# Patient Record
Sex: FEMALE | Race: WHITE | NOT HISPANIC OR LATINO
[De-identification: names, ages, dates, MRNs, and addresses within clinical notes are randomized per-mention and may not be internally consistent; named-entity substitution may affect disease eponyms.]

---

## 2018-02-01 PROBLEM — Z00.00 ENCOUNTER FOR PREVENTIVE HEALTH EXAMINATION: Status: ACTIVE | Noted: 2018-02-01

## 2018-02-12 ENCOUNTER — APPOINTMENT (OUTPATIENT)
Dept: RADIOLOGY | Facility: CLINIC | Age: 83
End: 2018-02-12

## 2018-08-27 ENCOUNTER — APPOINTMENT (OUTPATIENT)
Dept: RADIOLOGY | Facility: CLINIC | Age: 83
End: 2018-08-27
Payer: MEDICARE

## 2018-08-27 ENCOUNTER — OUTPATIENT (OUTPATIENT)
Dept: OUTPATIENT SERVICES | Facility: HOSPITAL | Age: 83
LOS: 1 days | End: 2018-08-27
Payer: MEDICARE

## 2018-08-27 DIAGNOSIS — Z00.8 ENCOUNTER FOR OTHER GENERAL EXAMINATION: ICD-10-CM

## 2018-08-27 PROCEDURE — 77080 DXA BONE DENSITY AXIAL: CPT | Mod: 26

## 2018-08-27 PROCEDURE — 77080 DXA BONE DENSITY AXIAL: CPT

## 2018-11-14 ENCOUNTER — OUTPATIENT (OUTPATIENT)
Dept: OUTPATIENT SERVICES | Facility: HOSPITAL | Age: 83
LOS: 1 days | End: 2018-11-14

## 2018-11-14 ENCOUNTER — APPOINTMENT (OUTPATIENT)
Dept: RADIOLOGY | Facility: CLINIC | Age: 83
End: 2018-11-14
Payer: SUBSIDIZED

## 2018-11-14 DIAGNOSIS — Z13.820 ENCOUNTER FOR SCREENING FOR OSTEOPOROSIS: ICD-10-CM

## 2018-11-14 PROCEDURE — 77080 DXA BONE DENSITY AXIAL: CPT

## 2018-11-14 PROCEDURE — 77080 DXA BONE DENSITY AXIAL: CPT | Mod: 26

## 2022-07-06 ENCOUNTER — OFFICE (OUTPATIENT)
Dept: URBAN - METROPOLITAN AREA CLINIC 35 | Facility: CLINIC | Age: 87
Setting detail: OPHTHALMOLOGY
End: 2022-07-06
Payer: MEDICARE

## 2022-07-06 DIAGNOSIS — H02.403: ICD-10-CM

## 2022-07-06 DIAGNOSIS — H52.4: ICD-10-CM

## 2022-07-06 DIAGNOSIS — H40.011: ICD-10-CM

## 2022-07-06 DIAGNOSIS — H35.342: ICD-10-CM

## 2022-07-06 DIAGNOSIS — Z96.1: ICD-10-CM

## 2022-07-06 PROCEDURE — 99204 OFFICE O/P NEW MOD 45 MIN: CPT | Performed by: OPHTHALMOLOGY

## 2022-07-06 PROCEDURE — 92015 DETERMINE REFRACTIVE STATE: CPT | Performed by: OPHTHALMOLOGY

## 2022-07-06 PROCEDURE — 92134 CPTRZ OPH DX IMG PST SGM RTA: CPT | Performed by: OPHTHALMOLOGY

## 2022-07-06 ASSESSMENT — REFRACTION_AUTOREFRACTION
OD_AXIS: 146
OS_SPHERE: -1.75
OS_CYLINDER: -0.75
OD_CYLINDER: -1.50
OD_SPHERE: +0.50
OS_AXIS: 039

## 2022-07-06 ASSESSMENT — REFRACTION_CURRENTRX
OS_VPRISM_DIRECTION: SV
OS_SPHERE: +2.00
OD_VPRISM_DIRECTION: SV
OS_OVR_VA: 20/
OD_OVR_VA: 20/
OD_SPHERE: +2.00

## 2022-07-06 ASSESSMENT — KERATOMETRY
OS_AXISANGLE_DEGREES: 138
OD_K1POWER_DIOPTERS: 42.00
OS_K1POWER_DIOPTERS: 43.00
OD_AXISANGLE_DEGREES: 063
OS_K2POWER_DIOPTERS: 44.00
OD_K2POWER_DIOPTERS: 43.00

## 2022-07-06 ASSESSMENT — SPHEQUIV_DERIVED
OS_SPHEQUIV: -2.125
OS_SPHEQUIV: -1.875
OD_SPHEQUIV: -0.25
OD_SPHEQUIV: -0.25
OS_SPHEQUIV: -2.125
OD_SPHEQUIV: -0.25

## 2022-07-06 ASSESSMENT — AXIALLENGTH_DERIVED
OD_AL: 24.066
OS_AL: 24.4499
OS_AL: 24.4499
OS_AL: 24.3458
OD_AL: 24.066
OD_AL: 24.066

## 2022-07-06 ASSESSMENT — REFRACTION_MANIFEST
OD_CYLINDER: -1.50
OD_SPHERE: +0.50
OD_VA1: 20/25-2
OD_VA1: 20/25-2
OS_AXIS: 040
OD_SPHERE: +0.50
OD_AXIS: 145
OS_CYLINDER: -0.75
OS_AXIS: 040
OS_VA1: 20/50-2
OS_SPHERE: -1.75
OD_CYLINDER: -1.50
OD_AXIS: 145
OS_ADD: +2.75
OD_ADD: +2.75
OS_SPHERE: -1.50
OS_VA1: 20/60+2
OS_CYLINDER: -0.75

## 2022-07-06 ASSESSMENT — LID POSITION - COMMENTS
OS_COMMENTS: LEVATOR DEHISCENCE
OD_COMMENTS: LEVATOR DEHISCENCE

## 2022-07-06 ASSESSMENT — TONOMETRY
OD_IOP_MMHG: 16
OS_IOP_MMHG: 16

## 2022-07-06 ASSESSMENT — LID POSITION - PTOSIS
OS_PTOSIS: LUL 2+
OD_PTOSIS: RUL 2+

## 2022-07-06 ASSESSMENT — CONFRONTATIONAL VISUAL FIELD TEST (CVF)
OD_FINDINGS: FULL
OS_FINDINGS: FULL

## 2022-07-06 ASSESSMENT — VISUAL ACUITY
OS_BCVA: 20/20-2
OD_BCVA: 20/60

## 2022-07-26 ENCOUNTER — OFFICE (OUTPATIENT)
Dept: URBAN - METROPOLITAN AREA CLINIC 35 | Facility: CLINIC | Age: 87
Setting detail: OPHTHALMOLOGY
End: 2022-07-26
Payer: MEDICARE

## 2022-07-26 DIAGNOSIS — H40.011: ICD-10-CM

## 2022-07-26 DIAGNOSIS — H02.403: ICD-10-CM

## 2022-07-26 DIAGNOSIS — H26.492: ICD-10-CM

## 2022-07-26 DIAGNOSIS — H35.342: ICD-10-CM

## 2022-07-26 DIAGNOSIS — Z96.1: ICD-10-CM

## 2022-07-26 PROBLEM — H52.4 PRESBYOPIA: Status: ACTIVE | Noted: 2022-07-06

## 2022-07-26 PROCEDURE — 99213 OFFICE O/P EST LOW 20 MIN: CPT | Performed by: OPHTHALMOLOGY

## 2022-07-26 PROCEDURE — 92133 CPTRZD OPH DX IMG PST SGM ON: CPT | Performed by: OPHTHALMOLOGY

## 2022-07-26 PROCEDURE — 92083 EXTENDED VISUAL FIELD XM: CPT | Performed by: OPHTHALMOLOGY

## 2022-07-26 ASSESSMENT — REFRACTION_MANIFEST
OS_ADD: +2.75
OD_CYLINDER: -1.50
OD_AXIS: 065
OD_VA1: 20/60-1
OD_SPHERE: +0.50
OD_SPHERE: +0.50
OD_VA1: 20/25-2
OD_AXIS: 155
OD_VA1: 20/60-1
OS_SPHERE: -1.75
OS_VA1: 20/60+2
OS_SPHERE: -1.50
OD_VA1: 20/25-2
OD_SPHERE: +0.50
OD_CYLINDER: -1.50
OD_CYLINDER: -1.50
OS_AXIS: 040
OD_CYLINDER: +1.50
OS_CYLINDER: -0.75
OS_AXIS: 040
OS_CYLINDER: -0.75
OD_ADD: +2.75
OS_VA1: 20/50-2
OD_SPHERE: -0.50
OD_AXIS: 145
OD_AXIS: 145

## 2022-07-26 ASSESSMENT — REFRACTION_CURRENTRX
OS_SPHERE: +2.00
OD_SPHERE: +0.50
OD_CYLINDER: -1.50
OD_VPRISM_DIRECTION: SV
OD_SPHERE: +2.00
OS_VPRISM_DIRECTION: PROGS
OD_OVR_VA: 20/
OS_AXIS: 036
OS_SPHERE: -1.50
OD_VPRISM_DIRECTION: PROGS
OD_OVR_VA: 20/
OS_OVR_VA: 20/
OS_OVR_VA: 20/
OS_VPRISM_DIRECTION: SV
OD_AXIS: 134
OS_CYLINDER: -0.75
OS_ADD: +3.00
OD_ADD: +3.00

## 2022-07-26 ASSESSMENT — REFRACTION_AUTOREFRACTION
OD_CYLINDER: -1.50
OD_AXIS: 158
OS_CYLINDER: -1.00
OD_SPHERE: +0.75
OS_SPHERE: -1.00
OS_AXIS: 061

## 2022-07-26 ASSESSMENT — SPHEQUIV_DERIVED
OD_SPHEQUIV: 0.25
OS_SPHEQUIV: -2.125
OD_SPHEQUIV: -0.25
OD_SPHEQUIV: 0
OD_SPHEQUIV: -0.25
OD_SPHEQUIV: -0.25
OS_SPHEQUIV: -1.5
OS_SPHEQUIV: -1.875

## 2022-07-26 ASSESSMENT — AXIALLENGTH_DERIVED
OD_AL: 23.8763
OS_AL: 24.5429
OD_AL: 23.6785
OD_AL: 23.777
OS_AL: 24.3858
OS_AL: 24.6488

## 2022-07-26 ASSESSMENT — LID POSITION - PTOSIS
OD_PTOSIS: RUL 2+
OS_PTOSIS: LUL 2+

## 2022-07-26 ASSESSMENT — TONOMETRY: OD_IOP_MMHG: 15

## 2022-07-26 ASSESSMENT — KERATOMETRY
OD_K2POWER_DIOPTERS: 44.00
OS_AXISANGLE_DEGREES: 155
OS_K1POWER_DIOPTERS: 42.50
OD_K1POWER_DIOPTERS: 42.00
OS_K2POWER_DIOPTERS: 43.50
OD_AXISANGLE_DEGREES: 070

## 2022-07-26 ASSESSMENT — LID POSITION - COMMENTS
OD_COMMENTS: LEVATOR DEHISCENCE
OS_COMMENTS: LEVATOR DEHISCENCE

## 2022-07-26 ASSESSMENT — VISUAL ACUITY
OD_BCVA: 20/40-2
OS_BCVA: 20/50

## 2022-07-26 ASSESSMENT — CONFRONTATIONAL VISUAL FIELD TEST (CVF)
OS_FINDINGS: FULL
OD_FINDINGS: FULL

## 2023-07-11 ENCOUNTER — APPOINTMENT (OUTPATIENT)
Dept: GASTROENTEROLOGY | Facility: CLINIC | Age: 88
End: 2023-07-11
Payer: MEDICARE

## 2023-07-11 VITALS
OXYGEN SATURATION: 99 % | HEIGHT: 63 IN | BODY MASS INDEX: 23.04 KG/M2 | WEIGHT: 130 LBS | DIASTOLIC BLOOD PRESSURE: 80 MMHG | TEMPERATURE: 97.3 F | SYSTOLIC BLOOD PRESSURE: 122 MMHG | HEART RATE: 75 BPM

## 2023-07-11 DIAGNOSIS — Z80.8 FAMILY HISTORY OF MALIGNANT NEOPLASM OF OTHER ORGANS OR SYSTEMS: ICD-10-CM

## 2023-07-11 DIAGNOSIS — K92.2 GASTROINTESTINAL HEMORRHAGE, UNSPECIFIED: ICD-10-CM

## 2023-07-11 PROCEDURE — 99204 OFFICE O/P NEW MOD 45 MIN: CPT

## 2023-07-11 RX ORDER — VITAMIN B COMPLEX
CAPSULE ORAL
Refills: 0 | Status: ACTIVE | COMMUNITY

## 2023-07-11 RX ORDER — PANTOPRAZOLE SODIUM 40 MG/1
40 GRANULE, DELAYED RELEASE ORAL
Refills: 0 | Status: ACTIVE | COMMUNITY

## 2023-07-11 NOTE — HISTORY OF PRESENT ILLNESS
[FreeTextEntry1] : New patient presents to the office today for follow-up after hospitalization.  Patient is a 89-year-old female confined to a wheelchair who came accompanied by her son.  The patient had an acute lower GI bleed in February of this year and was hospitalized at [facility].  We have only a few pages of discharge instructions to review.  The patient states that she was brought by ambulance with acute lower GI bleeding and explosive bowel movements.  A colonoscopy was performed during hospitalization which patient tells me revealed evidence of diverticulosis thought to be the source of patient's bleeding.  The patient was also noted to be in atrial fibrillation at that time and was started on Eliquis which has since been discontinued.  The patient was scheduled to see gastroenterology as an outpatient but did not have a good experience at the other doctors office.  She left without being seen.  This is her first visit since hospitalization.  Patient is currently feeling well with no current nausea vomiting fever chills rectal bleeding or melena.  Bowel movements are formed without diarrhea mucus or blood.  Review of patient's records also shows that she was on vancomycin and patient tells me that she may have had C. difficile as well.?  Patient has no cardiac or pulmonary complaints but she is confined to a wheelchair for most of the day.  She can walk a few steps.

## 2023-07-11 NOTE — ASSESSMENT
[FreeTextEntry1] : Impression and plan elderly female presents to the office today several months after being admitted to the hospital [facility] for treatment of lower GI bleed.  As best I can glean the patient had a colonoscopy demonstrating diverticular source of bleeding.  This has since stopped.  The patient was scheduled to see another physician for follow-up but did not do so.  She sees Dr. Hipolito lopez  for primary care.  Patient currently denies any evidence of lower GI bleeding.  Stools are formed and normal in color.  She is currently off anticoagulation Eliquis and is scheduled to see cardiology for follow-up of atrial fibrillation discovered in the hospital.  At this time I would not plan on any further testing given the patient's frail state advanced age and seeming stability at this point.  Patient is encouraged to call back if any additional bleeding but I will refer the patient to the hospital should this occur.  I explained the limitations of my practice as far as obtaining records and caring for the patient while in hospital.  They seem to understand.